# Patient Record
Sex: FEMALE | Race: WHITE
[De-identification: names, ages, dates, MRNs, and addresses within clinical notes are randomized per-mention and may not be internally consistent; named-entity substitution may affect disease eponyms.]

---

## 2017-04-11 NOTE — XRAY REPORT
AP CHEST:



HISTORY: chest pain



2-lead pacemaker devices unchanged since 9/3/16. Heart size and 

pulmonary vascularity are within normal limits. The lungs are mildly 

hyperinflated but clear. No evidence for pneumonia, CHF or pneumothorax.



IMPRESSION:

No acute cardiopulmonary process.

## 2017-04-11 NOTE — EMERGENCY DEPARTMENT REPORT
HPI





- General


Chief Complaint: Chest Pain


Time Seen by Provider: 04/11/17 12:54





- HPI


HPI: 


This is a 72-year-old  female presents to the emergency department with 

complaint of midsternal chest pressure and some shortness of breath that 

started this morning.  She denies any back pain, nausea, vomiting, diaphoresis.

  She did not take anything but her normal medications for her symptoms prior 

to presentation.  She has a history of CHF, mentioned, diabetes, hypertension 

but denies any history of MI, CVA, PE/DVT.  Her cardiologist is Dr. Jam Hines and her primary care doctor is a James Monsalve.  No recent travel or 

sick contacts at home.  She came in by EMS and did not receive anything in 

route.





ED Past Medical Hx





- Past Medical History


Previous Medical History?: Yes


Hx Hypertension: Yes


Hx Congestive Heart Failure: Yes


Hx Diabetes: Yes


Hx Asthma: Yes


Hx Dementia: Yes


Additional medical history: BLIND LEFT EYE





- Surgical History


Past Surgical History?: Yes


Hx Pacemaker: Yes


Additional Surgical History: HYSTERECTOMY





- Social History


Smoking Status: Never Smoker


Substance Use Type: None





- Medications


Home Medications: 


 Home Medications











 Medication  Instructions  Recorded  Confirmed  Last Taken  Type


 


AtorvaSTATin [Lipitor] 10 mg PO DAILY 10/13/15 04/11/17 10/13/15 History


 


Fluticasone/Salmeterol [Advair 1 puff IH BID #1 disk.w.dev 11/23/15 04/11/17 

Unknown Rx





Diskus 250-50 mcg]     


 


Insulin NPH/Regular [NovoLIN 70/30] 30 unit SQ QPM 11/23/15 04/11/17 Unknown 

History


 


Insulin NPH/Regular [NovoLIN 70/30] 40 unit SQ QAM 11/23/15 04/11/17 Unknown 

History


 


Potassium Chloride 8 meq PO QDAY 11/23/15 04/11/17 Unknown History


 


Albuterol Sulfate [Ventolin HFA] 1 puff IH Q4H PRN #1 hfa.aer.ad 01/27/16 04/11/ 17 Unknown Rx


 


Furosemide [Lasix TAB] 20 mg PO QDAY 09/03/16 04/11/17 Unknown History


 


Losartan [Cozaar] 50 mg PO QDAY 09/03/16 04/11/17 Unknown History


 


Famotidine [Pepcid] 20 mg PO BID #20 tablet 09/04/16 04/11/17 Unknown Rx














ED Review of Systems


ROS: 


Stated complaint: CHEST PAIN/SHORTNESS OF BREATH


Other details as noted in HPI





Comment: All other systems reviewed and negative


Constitutional: denies: chills, fever


Eyes: denies: eye pain, eye discharge, vision change


ENT: denies: ear pain, throat pain


Respiratory: shortness of breath.  denies: cough, wheezing


Cardiovascular: chest pain.  denies: palpitations


Gastrointestinal: denies: abdominal pain, nausea, diarrhea


Genitourinary: denies: urgency, dysuria, discharge


Musculoskeletal: denies: back pain, joint swelling, arthralgia


Skin: denies: rash, lesions


Neurological: denies: headache, weakness, paresthesias





Physical Exam





- Physical Exam


Vital Signs: 


 Vital Signs











  04/11/17





  12:49


 


Temperature 98.2 F


 


Pulse Rate 79


 


Respiratory 19





Rate 


 


Blood Pressure 170/79





[Left] 


 


O2 Sat by Pulse 98





Oximetry 











Physical Exam: 


GENERAL: The patient is well-developed well-nourished.


HEENT: Normocephalic.  Atraumatic.  Extraocular motions are intact.  Patient 

has moist mucous membranes.  Pupils equal reactive to light bilaterally.


NECK: Supple.  Trachea is midline.


CHEST/LUNGS: Clear to auscultation.  There is no respiratory distress noted.


HEART/CARDIOVASCULAR: Regular.  There is no tachycardia.  There is no gallop 

rub or murmur.


ABDOMEN: Abdomen is soft, nontender.  Patient has normal bowel sounds.  There 

is no abdominal distention.


SKIN: There is no rash.  There is no edema.  There is no diaphoresis.


NEURO: The patient is awake, alert, and oriented.  The patient is cooperative.  

The patient has no focal neurologic deficits.  The patient has normal speech.


MUSCULOSKELETAL: There is no tenderness or deformity.  There is no limitation 

range of motion.  There is no evidence of acute injury.








ED Course


 Vital Signs











  04/11/17





  12:49


 


Temperature 98.2 F


 


Pulse Rate 79


 


Respiratory 19





Rate 


 


Blood Pressure 170/79





[Left] 


 


O2 Sat by Pulse 98





Oximetry 














ED Medical Decision Making





- Lab Data


Result diagrams: 


 04/11/17 13:09





 04/11/17 13:09





- EKG Data


-: EKG Interpreted by Me


EKG shows normal: sinus rhythm, axis (LAD), intervals, QRS complexes (Q waves 

to septal leads), ST-T waves (flat t waves)


Rate: normal





- EKG Data


When compared to previous EKG there are: previous EKG unavailable


Interpretation: nonspecific ST-T wave peterson, other (q waves to septal leads)





- Radiology Data


Radiology results: image reviewed


interpreted by me: 


Chest x-ray did not show any acute process.  Heart is normal shape and size.  

No effusions.  No pneumothorax.  No signs of pneumonia seen.








- Medical Decision Making


72-year-old female presents with chest pain and shortness of breath for the 

past few days.  EKG shows some Q waves but no obvious ST elevation MI.  Chest x-

ray does not show any acute process.  So far patient has negative troponin 1 

and a negative d-dimer.  Patient given some pain medication but still continues 

to have discomfort.  She had a stress test about 9 months ago.  However since 

the patient continues to have discomfort and has multiple risk factors, she'll 

be admitted to hospital for further evaluation and treatment.  She has been 

accepted for admission by the hospitalist, Dr. Mendez.








- Differential Diagnosis


MI, PE, pneumonia, costochondritis, bronchitis


Critical Care Time: No


Critical care attestation.: 


If time is entered above; I have spent that time in minutes in the direct care 

of this critically ill patient, excluding procedure time.








ED Disposition


Clinical Impression: 


 History of permanent cardiac pacemaker placement





Chest pain


Qualifiers:


 Chest pain type: unspecified Qualified Code(s): R07.9 - Chest pain, unspecified





Hypertension


Qualifiers:


 Hypertension type: essential hypertension Qualified Code(s): I10 - Essential (

primary) hypertension





Disposition: OP ADMITTED AS IP TO THIS HOSP


Is pt being admited?: Yes


Condition: Stable


Time of Disposition: 17:52

## 2017-04-11 NOTE — HISTORY AND PHYSICAL REPORT
History of Present Illness


Chief complaint: 





My chest Hurts





History of present illness: 


73 YO Female with DM, CHF, Dementia,Asthma,HTN, COPD, HLD, Metabolic Syndrome 

presents to ED for evaluation. Pt states that she has been experiencing pain in 

her chest for the past 1 day.  Pt states that pain in 5/10, substernal, 

nonradiating, not worsened with exertion, or relieved with rest. Pain is 

constant, not associated with meals. Pt denies leg pain, calf swelling, 

prolonged travel/immobility, individual/family history of DVT/PE, syncope, 

hemoptysis, trauma, productive cough, fever, unintentional weight loss, night 

sweats, or recent ill contacts.








Past History


Past Medical History: arthritis, COPD, diabetes, heart failure, hypertension, 

hyperlipidemia


Past Surgical History: hysterectomy, Other (Pacemaker placement.)


Social history: single, lives with family.  denies: smoking, alcohol abuse


Family history: diabetes, hypertension





Medications and Allergies


 Allergies











Allergy/AdvReac Type Severity Reaction Status Date / Time


 


aspirin AdvReac  Unknown Verified 01/27/16 03:19











 Home Medications











 Medication  Instructions  Recorded  Confirmed  Last Taken  Type


 


AtorvaSTATin [Lipitor] 10 mg PO DAILY 10/13/15 04/11/17 10/13/15 History


 


Fluticasone/Salmeterol [Advair 1 puff IH BID #1 disk.w.dev 11/23/15 04/11/17 

Unknown Rx





Diskus 250-50 mcg]     


 


Insulin NPH/Regular [NovoLIN 70/30] 30 unit SQ QPM 11/23/15 04/11/17 Unknown 

History


 


Insulin NPH/Regular [NovoLIN 70/30] 40 unit SQ QAM 11/23/15 04/11/17 Unknown 

History


 


Potassium Chloride 8 meq PO QDAY 11/23/15 04/11/17 Unknown History


 


Albuterol Sulfate [Ventolin HFA] 1 puff IH Q4H PRN #1 hfa.aer.ad 01/27/16 04/11/ 17 Unknown Rx


 


Furosemide [Lasix TAB] 20 mg PO QDAY 09/03/16 04/11/17 Unknown History


 


Losartan [Cozaar] 50 mg PO QDAY 09/03/16 04/11/17 Unknown History


 


Famotidine [Pepcid] 20 mg PO BID #20 tablet 09/04/16 04/11/17 Unknown Rx














Review of Systems


All systems: negative


Cardiovascular: chest pain





Exam





- Constitutional


Vitals: 


 











Temp Pulse Resp BP Pulse Ox


 


 98.2 F   79   19   170/79   98 


 


 04/11/17 12:49  04/11/17 12:49  04/11/17 12:49  04/11/17 12:49  04/11/17 12:49











General appearance: Present: obese





- EENT


Eyes: Present: PERRL


ENT: hearing intact, clear oral mucosa





- Neck


Neck: Present: supple, normal ROM





- Respiratory


Respiratory effort: normal


Respiratory: bilateral: CTA





- Cardiovascular


Heart Sounds: Present: S1 & S2.  Absent: rub, click





- Extremities


Extremities: pulses symmetrical, No edema


Peripheral Pulses: within normal limits





- Abdominal


General gastrointestinal: Present: soft, non-tender, non-distended, normal 

bowel sounds


Female genitourinary: Present: normal





- Integumentary


Integumentary: Present: clear, warm, dry





- Musculoskeletal


Musculoskeletal: gait normal, strength equal bilaterally





- Psychiatric


Psychiatric: appropriate mood/affect, intact judgment & insight





- Neurologic


Neurologic: CNII-XII intact, moves all extremities





Results





- Labs


CBC & Chem 7: 


 04/11/17 13:09





 04/11/17 13:09


Labs: 


 Abnormal lab results











  04/11/17 04/11/17 Range/Units





  13:09 13:09 


 


WBC  13.0 H   (4.5-11.0)  K/mm3


 


MCHC  35 H   (30-34)  %


 


RDW  16.3 H   (13.2-15.2)  %


 


Seg Neutrophils %  78.1 H   (40.0-70.0)  %


 


Seg Neutrophils #  10.2 H   (1.8-7.7)  K/mm3


 


BUN   25 H  (7-17)  mg/dL


 


Glucose   157 H  ()  mg/dL














Assessment and Plan





- Patient Problems


(1) Angina at rest


Current Visit: Yes   Status: Acute   


Plan to address problem: 


Chest pain protocol: Serial cardiac enzymes, ekg, telemetry monitoring, D dimer

, PPI therapy








(2) Accelerated hypertension


Current Visit: Yes   Status: Acute   


Plan to address problem: 


Monitor BP q shift, resume home medication








(3) Diabetes


Current Visit: Yes   Status: Acute   


Qualifiers: 


   Diabetes mellitus type: D   Diabetes mellitus complication status: D   

Diabetes mellitus complication detail: D   Diabetic retinopathy severity: D   

Proliferative retinopathy type: P   Diabetes mellitus macular edema: D   

Diabetes mellitus long term insulin use: D   Laterality: L   Chronic kidney 

disease stage: C 


Plan to address problem: 


ADA diet, insulin, accu check








(4) COPD (chronic obstructive pulmonary disease)


Current Visit: Yes   Status: Acute   


Qualifiers: 


   COPD type: C   Chronic bronchitis type: C   Emphysema type: E 


Plan to address problem: 


supplemental oxygen, nebs, NIPPV as clinically indicated. 








(5) DVT prophylaxis


Current Visit: Yes   Status: Acute

## 2017-04-11 NOTE — ADMIT CRITERIA FORM
Admission Criteria Documentation: 





                                                CHEST PAIN





Clinical Indications for Admission to Inpatient Care





                                                                         (Place 

'X' for any and all applicable criteria): 





Admission is indicated for chest pain and ANY ONE of the following(1)(2)(3)(4)(5

): 





[ ]I.    Angina with acute coronary syndrome (Also use Myocardial Infarction or 

Angina guideline)


[ ]II.   Hemodynamic instability


[X ]III.  Angina needing acute intervention as indicated by ALL of the following

(11)(12):


        [X ]a)  Unstable angina is present as indicated by angina that is ANY 

ONE of the following:


                [ ]i)     New onset   


                [ ]ii)    Nocturnal   


                [X ]iii)   Prolonged at rest   


                [ ]iv)   Progressive


        [ X]b)  Angina warrants acute intervention as indicated by ANY ONE of 

the following:


                [ ]i)     Recurrent angina (e.g, not responding as previously 

to treatment)


                [ ]ii)     Angina at rest or with low-level activities despite 

initial medical therapy


                [ ]iii)    New or presumably new ST-segment depression on ECG


                [ ]iv)    Signs or symptoms of heart failure (eg, dyspnea, 

pulmonary edema)


                [ ]v)     New or worsening mitral regurgitation


                [ ]vi)    Hemodynamic instability


                [ ]vii)   Dangerous arrhythmia (eg, sustained ventricular 

tachycardia)          


                [ ]viii)   History of percutaneous coronary intervention within 

6 months          


                [ ]ix)    History of coronary artery bypass graft surgery


                [ ]x)    JESI risk score of 2 or greater[A]


                [X ]xi)    History of Diabetes(14)


                [ ]xii)   High-risk cardiac ischemia findings on noninvasive 

testing (e.g, echocardiogram,


                          treadmill testing, nuclear scan)


                [ ]xiii)  Chronic renal insufficiency (ie, estimated GFR less 

than 60 mL/min/1.732m)


                [ ]xiv)  Left ventricular ejection fraction less than 40%


              


[ ]IV.   Evidence of MI (eg, cardiac biomarkers positive, ST-segment elevation 

on ECG) also use Myocardial Infarction Criteria Form.


[ ]V.    Pulmonary edema 


[ ]VI.   Respiratory distress


[ ]VII.  Chest pain indicative of serious diagnosis other than coronary artery 

disease (eg, aortic dissection)





[ ]VIII. Contraindications and/or Inappropriate clinical situations for 

Observational Care in patients


          with Chest Pain, when ANY ONE of the following is required:


          [ ]a)   Patient with risk factor for pulmonary embolism, acute 

coronary syndrome and myocardial infarction (18)


          [ ]b)   Patient with Pulmonary embolism require an average LOS of 4.3 

days, therefore emergency 


                   department observation management is inappropriate 18,23


          [ ]c)   Painful condition/s in the elderly, have the highest rate of 

recidivism after emergency department observation management (10.8%)  20,21,22


          [ ]d)   Elevated cardiac biomarker requires intensive and exhaustive 

care (19)


[X ]IX.  General contraindications and/or Inappropriate clinical situations for 

Observational Care in patients


          with Chest Pain, when ANY ONE of the following is required:


           [X ]a)   Prediction of prolongation of LOS based on ANY ONE of the 

following may be considered as 


                    a contraindication for observational care 2, 3, 4, 5, 6, 7, 

8, 9, 10, 11


                    [X ]i)    Age > 65 yrs.


                    [ ]ii)   Patient arriving by ambulance


                    [ ]iii)  Patient with high acuity


                    [ ]iv)  Patient requiring vital sign monitoring


                    [ ]v)   Patient on IV medication


           [ ]b)   Systolic blood pressures  180mmHg  3,12


           [ ]c)   Patient with altered mental status including delirium and 

other alteration of consciousness, (3)


           [ ]d)   Patient whose discharge disposition will be to a skilled 

nursing home or rehabilitation home should 


                    not be managed in Emergency Department Observation Unit. 

CMS rule requires 3 days hospital stay before such placement. 3,13


           [ ]e)   Patient with failure to thrive due to broad array of 

etiologies  3,16,17


           [ ]f)    Inability to ambulate  3,14








Extended stay beyond goal length of stay may be needed for (1)(28):


[ ]a)   Specific condition diagnosed after evaluation (eg, pulmonary embolism, 

aortic dissection) 


[ ]b)   Unstable angina


[ ]c)   Continued suspicion of acute coronary syndrome with inability to 

complete needed cardiac evaluation


         (eg, patient clinically unable to undergo stress testing)


[ ]d)   Myocardial infarction








(Contents from ANGINA and CHEST PAIN clinical indications for admission to 

inpatient care have been integrated in this form) 








The original [employer] content created by [employer] has been revised. 


The portions of the content which have been revised are identified through the 

use of italic text or in bold, and [employer] [facility] [employer]


has neither reviewed nor approved the modified material. All other unmodified 

content is copyright  [facility] [employer].





Please see references footnoted in the original [facility] [employer] edition 

2016





Admission Criteria Met: Yes

## 2017-04-12 NOTE — DISCHARGE SUMMARY
Providers





- Providers


Date of Admission: 


04/11/17 15:08





Date of discharge: 04/12/17


Attending physician: 


JAMEL ROMERO





 





04/11/17


Consult to Cardiac Rehabilitation [CONS] Routine 


   Reason For Exam: Phase I











Primary care physician: 


PRIMARY CARE MD








Hospitalization


Condition: Stable


Hospital course: 


Patient is a 71 yo woman with DM, CHF, Dementia, Asthma, HTN, COPD and HLD, who 

presented with CP. Cardiac enzymes was negative. CXR was negative for acute 

findings, WBC was 13 without s/sx of infection. 





-CP, msk, costochondritis if stress test negative


-dm relative asym hypoglycemia due to npo. 


-chf without exacerbation


-copd without exacerbation











Disposition: DISCHARGED TO HOME OR SELFCARE





Core Measure Documentation





- Palliative Care


Palliative Care/ Comfort Measures: Not Applicable





- Core Measures


Any of the following diagnoses?: none





- VTE Discharge Requirements


Deep Vein Thrombosis/Pulmonary Embolism Present on Admission: No


Has pt received <5 days of overlap therapy or INR<2.0: No


Anticoagulant overlap therapy prescribed at discharge: No


Contraindication No Overlap Therapy order at DC: Not Indicated





Exam





- Physical Exam


Narrative exam: 


GEN: WDWN, NAD, AWAKE, ALERT, ORIENTATED 3


HEENT: NCAT, PERRL, EOMI, OP CLEAR


NECK: SUPPLE, NO THYROMEGALY, NO JVD, NO LAD


CVS: RRR, NORMAL S1S2


LUNGS/CHEST: CTA B, NORMAL CHEST EXPANSION B, GOOD AIR ENTRY B, reproducible 

substernal chest wall tenderness 


ABD: SOFT NTND, GBS, NO REBOUND OR GUARDING


EXT/SKIN: NO SIGNIFICANT EDEMA OR RASH


MSK: FROM X 4 EXTREMITIES


NEURO: CN 2-12 GROSSLY INTACT, NO new FOCAL DEFICITS


PSY: CALM








- Constitutional


Vitals: 


 











Temp Pulse Resp BP Pulse Ox


 


 97.6 F   84   18   141/65   96 


 


 04/12/17 09:07  04/12/17 09:07  04/12/17 09:07  04/12/17 09:07  04/12/17 09:07














Plan


Activity: other


Additional Instructions: Over the counter baby aspirin daily would be 

beneficial.


Follow up with: 


PRIMARY CARE,MD [Primary Care Provider] - 7 Days

## 2017-04-12 NOTE — TREADMILL REPORT
NUCLEAR PERFUSION SCAN



REFERRING PHYSICIAN:  Dr. Mendez.



PROTOCOL:  The patient was brought to the stress lab in a postabsorptive state,

given 10 mCi of technetium 99m at rest.  The patient underwent rest imaging. 

The patient underwent Lexiscan stress test per standard protocol.  At peak

stress, the patient was given 26 mCi of technetium 99m.  Shortly thereafter, the

patient underwent stress imaging.  Raw imaging reveals mild GI artifact, no

significant motion artifact.  SPECT imaging examined carefully in the horizontal

long axis, vertical long axis, and short axis views.  There is normal homogenous

uptake of radioisotope in all reported segments.  No evidence of a significant

fixed or reversible perfusion defects suggestive of prior infarction or

ischemia.  Gated wall motion reveals normal systolic thickening.  Calculated

ejection fraction of 80%.  No TID.



CONCLUSIONS:

1.  Normal myocardial perfusion scan without evidence of active ischemia or

prior infarction.

2.  Normal left ventricular systolic performance without evidence of transient

ischemic dilatation or stress-induced segmental wall motion abnormalities.





DD: 04/12/2017 12:32

DT: 04/12/2017 23:09

JOB# [id number]  [id number]

JUDY/JOSE

## 2017-04-12 NOTE — PROGRESS NOTE
Assessment and Plan


Assessment and plan: 


Patient is a 73 yo woman with DM, CHF, Dementia, Asthma, HTN, COPD and HLD, who 

presented with CP. Cardiac enzymes was negative. CXR was negative for acute 

findings, WBC was 13 without s/sx of infection. 





-CP, msk, costochondritis if stress test negative


-dm hyperosmolality most likely








History


Interval history: 


Patient seen and examined. Follow up on chest pain which has resolved.  

Overnight uneventful. No cp, sob, n/v or severe headaches. Imaging, old records

, testing, labs, nursing notes reviewed. Plan discussed with patient.  








Hospitalist Physical





- Physical exam


Narrative exam: 


GEN: WDWN, NAD, AWAKE, ALERT, ORIENTATED 3


HEENT: NCAT, PERRL, EOMI, OP CLEAR


NECK: SUPPLE, NO THYROMEGALY, NO JVD, NO LAD


CVS: RRR, NORMAL S1S2


LUNGS/CHEST: CTA B, NORMAL CHEST EXPANSION B, GOOD AIR ENTRY B, reproducible 

substernal chest wall tenderness 


ABD: SOFT NTND, GBS, NO REBOUND OR GUARDING


EXT/SKIN: NO SIGNIFICANT EDEMA OR RASH


MSK: FROM X 4 EXTREMITIES


NEURO: CN 2-12 GROSSLY INTACT, NO new FOCAL DEFICITS


PSY: CALM








- Constitutional


Vitals: 


 











Temp Pulse Resp BP Pulse Ox


 


 97.6 F   84   18   141/65   96 


 


 04/12/17 09:07  04/12/17 09:07  04/12/17 09:07  04/12/17 09:07  04/12/17 09:07











General appearance: Present: obese





Results





- Labs


CBC & Chem 7: 


 04/11/17 13:09





 04/11/17 13:09


Labs: 


 Laboratory Last Values











WBC  13.0 K/mm3 (4.5-11.0)  H  04/11/17  13:09    


 


RBC  4.16 M/mm3 (3.65-5.03)   04/11/17  13:09    


 


Hgb  13.3 gm/dl (10.1-14.3)   04/11/17  13:09    


 


Hct  38.0 % (30.3-42.9)   04/11/17  13:09    


 


MCV  91 fl (79-97)   04/11/17  13:09    


 


MCH  32 pg (28-32)   04/11/17  13:09    


 


MCHC  35 % (30-34)  H  04/11/17  13:09    


 


RDW  16.3 % (13.2-15.2)  H  04/11/17  13:09    


 


Plt Count  265 K/mm3 (140-440)   04/11/17  13:09    


 


Lymph % (Auto)  14.6 % (13.4-35.0)   04/11/17  13:09    


 


Mono % (Auto)  5.6 % (0.0-7.3)   04/11/17  13:09    


 


Eos % (Auto)  1.2 % (0.0-4.3)   04/11/17  13:09    


 


Baso % (Auto)  0.5 % (0.0-1.8)   04/11/17  13:09    


 


Lymph #  1.9 K/mm3 (1.2-5.4)   04/11/17  13:09    


 


Mono #  0.7 K/mm3 (0.0-0.8)   04/11/17  13:09    


 


Eos #  0.2 K/mm3 (0.0-0.4)   04/11/17  13:09    


 


Baso #  0.1 K/mm3 (0.0-0.1)   04/11/17  13:09    


 


Seg Neutrophils %  78.1 % (40.0-70.0)  H  04/11/17  13:09    


 


Seg Neutrophils #  10.2 K/mm3 (1.8-7.7)  H  04/11/17  13:09    


 


D-Dimer  < 135.00 ng/mlDDU (0-234)   04/11/17  15:16    


 


Sodium  137 mmol/L (137-145)   04/11/17  13:09    


 


Potassium  3.7 mmol/L (3.6-5.0)   04/11/17  13:09    


 


Chloride  99.3 mmol/L ()   04/11/17  13:09    


 


Carbon Dioxide  23 mmol/L (22-30)   04/11/17  13:09    


 


Anion Gap  18 mmol/L  04/11/17  13:09    


 


BUN  25 mg/dL (7-17)  H  04/11/17  13:09    


 


Creatinine  0.8 mg/dL (0.7-1.2)   04/11/17  13:09    


 


Estimated GFR  > 60 ml/min  04/11/17  13:09    


 


BUN/Creatinine Ratio  31.25 %  04/11/17  13:09    


 


Glucose  157 mg/dL ()  H  04/11/17  13:09    


 


POC Glucose  297  ()  H  04/11/17  22:16    


 


Calcium  9.3 mg/dL (8.4-10.2)   04/11/17  13:09    


 


Total Creatine Kinase  56 units/L ()   04/11/17  21:17    


 


CK-MB (CK-2)  1.3 ng/mL (0.0-4.0)   04/11/17  21:17    


 


CK-MB (CK-2) Rel Index  2.3  (0-4)   04/11/17  21:17    


 


Troponin T  < 0.010 ng/mL (0.00-0.029)   04/11/17  21:17    


 


Triglycerides  182 mg/dL (2-149)  H  04/11/17  17:55    


 


Cholesterol  193 mg/dL ()   04/11/17  17:55    


 


LDL Cholesterol Direct  123 mg/dL ()   04/11/17  17:55    


 


HDL Cholesterol  34 mg/dL (40-59)  L  04/11/17  17:55    


 


Cholesterol/HDL Ratio  5.67 %  04/11/17  17:55    














- Imaging and Cardiology


Chest x-ray: report reviewed

## 2019-06-25 ENCOUNTER — HOSPITAL ENCOUNTER (OUTPATIENT)
Dept: HOSPITAL 5 - LAB | Age: 74
Discharge: HOME | End: 2019-06-25
Attending: INTERNAL MEDICINE
Payer: MEDICARE

## 2019-06-25 DIAGNOSIS — J45.909: ICD-10-CM

## 2019-06-25 DIAGNOSIS — E11.65: ICD-10-CM

## 2019-06-25 DIAGNOSIS — Z90.710: ICD-10-CM

## 2019-06-25 DIAGNOSIS — Z00.01: Primary | ICD-10-CM

## 2019-06-25 DIAGNOSIS — I11.0: ICD-10-CM

## 2019-06-25 DIAGNOSIS — I50.9: ICD-10-CM

## 2019-06-25 DIAGNOSIS — E78.5: ICD-10-CM

## 2019-06-25 DIAGNOSIS — Z13.21: ICD-10-CM

## 2019-06-25 LAB
ALBUMIN SERPL-MCNC: 4.7 G/DL (ref 3.9–5)
ALT SERPL-CCNC: 12 UNITS/L (ref 7–56)
BUN SERPL-MCNC: 29 MG/DL (ref 7–17)
BUN/CREAT SERPL: 32 %
CALCIUM SERPL-MCNC: 10 MG/DL (ref 8.4–10.2)
HCT VFR BLD CALC: 40.8 % (ref 30.3–42.9)
HDLC SERPL-MCNC: 82 MG/DL (ref 40–59)
HEMOLYSIS INDEX: 3
HGB BLD-MCNC: 14.1 GM/DL (ref 10.1–14.3)
MCHC RBC AUTO-ENTMCNC: 35 % (ref 30–34)
MCV RBC AUTO: 94 FL (ref 79–97)
PLATELET # BLD: 360 K/MM3 (ref 140–440)
RBC # BLD AUTO: 4.36 M/MM3 (ref 3.65–5.03)

## 2019-06-25 PROCEDURE — 82607 VITAMIN B-12: CPT

## 2019-06-25 PROCEDURE — 80053 COMPREHEN METABOLIC PANEL: CPT

## 2019-06-25 PROCEDURE — 82306 VITAMIN D 25 HYDROXY: CPT

## 2019-06-25 PROCEDURE — 36415 COLL VENOUS BLD VENIPUNCTURE: CPT

## 2019-06-25 PROCEDURE — 83036 HEMOGLOBIN GLYCOSYLATED A1C: CPT

## 2019-06-25 PROCEDURE — 80061 LIPID PANEL: CPT

## 2019-06-25 PROCEDURE — 85027 COMPLETE CBC AUTOMATED: CPT

## 2019-06-25 PROCEDURE — 84443 ASSAY THYROID STIM HORMONE: CPT

## 2019-06-28 LAB — VITAMIN D2 SERPL-MCNC: 4 NG/ML
